# Patient Record
Sex: FEMALE | Race: WHITE | NOT HISPANIC OR LATINO | Employment: OTHER | URBAN - NONMETROPOLITAN AREA
[De-identification: names, ages, dates, MRNs, and addresses within clinical notes are randomized per-mention and may not be internally consistent; named-entity substitution may affect disease eponyms.]

---

## 2022-08-31 PROBLEM — M75.41 IMPINGEMENT SYNDROME OF RIGHT SHOULDER: Status: ACTIVE | Noted: 2022-08-31

## 2023-10-12 ENCOUNTER — OFFICE VISIT (OUTPATIENT)
Dept: MEDICAL GROUP | Facility: PHYSICIAN GROUP | Age: 58
End: 2023-10-12
Payer: COMMERCIAL

## 2023-10-12 VITALS
HEART RATE: 71 BPM | RESPIRATION RATE: 20 BRPM | BODY MASS INDEX: 39.35 KG/M2 | WEIGHT: 236.2 LBS | OXYGEN SATURATION: 96 % | SYSTOLIC BLOOD PRESSURE: 132 MMHG | HEIGHT: 65 IN | TEMPERATURE: 96.7 F | DIASTOLIC BLOOD PRESSURE: 84 MMHG

## 2023-10-12 DIAGNOSIS — K52.9 COLITIS: ICD-10-CM

## 2023-10-12 DIAGNOSIS — D64.9 ANEMIA, UNSPECIFIED TYPE: ICD-10-CM

## 2023-10-12 DIAGNOSIS — Z23 NEED FOR VACCINATION: ICD-10-CM

## 2023-10-12 DIAGNOSIS — E78.2 MIXED HYPERLIPIDEMIA: ICD-10-CM

## 2023-10-12 DIAGNOSIS — I10 ESSENTIAL HYPERTENSION: ICD-10-CM

## 2023-10-12 DIAGNOSIS — Z87.440 HISTORY OF RECURRENT UTIS: ICD-10-CM

## 2023-10-12 DIAGNOSIS — R73.09 ELEVATED GLUCOSE LEVEL: ICD-10-CM

## 2023-10-12 DIAGNOSIS — D75.89 MACROCYTOSIS WITHOUT ANEMIA: ICD-10-CM

## 2023-10-12 DIAGNOSIS — N18.31 STAGE 3A CHRONIC KIDNEY DISEASE: ICD-10-CM

## 2023-10-12 DIAGNOSIS — K21.9 GASTROESOPHAGEAL REFLUX DISEASE WITHOUT ESOPHAGITIS: ICD-10-CM

## 2023-10-12 DIAGNOSIS — Z12.83 SCREENING FOR SKIN CANCER: ICD-10-CM

## 2023-10-12 PROBLEM — M48.062 LUMBAR STENOSIS WITH NEUROGENIC CLAUDICATION: Status: RESOLVED | Noted: 2023-05-30 | Resolved: 2023-10-12

## 2023-10-12 PROBLEM — M75.120 FULL THICKNESS ROTATOR CUFF TEAR: Status: ACTIVE | Noted: 2022-10-10

## 2023-10-12 PROBLEM — M75.41 IMPINGEMENT SYNDROME OF RIGHT SHOULDER: Status: RESOLVED | Noted: 2022-08-31 | Resolved: 2023-10-12

## 2023-10-12 PROBLEM — M48.062 LUMBAR STENOSIS WITH NEUROGENIC CLAUDICATION: Status: ACTIVE | Noted: 2023-05-30

## 2023-10-12 PROBLEM — M75.120 FULL THICKNESS ROTATOR CUFF TEAR: Status: RESOLVED | Noted: 2022-10-10 | Resolved: 2023-10-12

## 2023-10-12 LAB
HBA1C MFR BLD: 5.6 % (ref ?–5.8)
POCT INT CON NEG: NEGATIVE
POCT INT CON POS: POSITIVE

## 2023-10-12 PROCEDURE — 3079F DIAST BP 80-89 MM HG: CPT | Performed by: NURSE PRACTITIONER

## 2023-10-12 PROCEDURE — 90715 TDAP VACCINE 7 YRS/> IM: CPT | Performed by: NURSE PRACTITIONER

## 2023-10-12 PROCEDURE — 3075F SYST BP GE 130 - 139MM HG: CPT | Performed by: NURSE PRACTITIONER

## 2023-10-12 PROCEDURE — 83036 HEMOGLOBIN GLYCOSYLATED A1C: CPT | Performed by: NURSE PRACTITIONER

## 2023-10-12 PROCEDURE — 90471 IMMUNIZATION ADMIN: CPT | Performed by: NURSE PRACTITIONER

## 2023-10-12 PROCEDURE — 99204 OFFICE O/P NEW MOD 45 MIN: CPT | Mod: 25 | Performed by: NURSE PRACTITIONER

## 2023-10-12 RX ORDER — ROSUVASTATIN CALCIUM 20 MG/1
1 TABLET, COATED ORAL
COMMUNITY
End: 2023-11-20 | Stop reason: SDUPTHER

## 2023-10-12 RX ORDER — MESALAMINE 1.2 G/1
2 TABLET, DELAYED RELEASE ORAL
COMMUNITY
End: 2023-10-12 | Stop reason: SDUPTHER

## 2023-10-12 RX ORDER — OMEPRAZOLE 20 MG/1
20 CAPSULE, DELAYED RELEASE ORAL
COMMUNITY

## 2023-10-12 RX ORDER — CEFDINIR 300 MG/1
CAPSULE ORAL
COMMUNITY
End: 2023-10-12

## 2023-10-12 RX ORDER — LOSARTAN POTASSIUM 25 MG/1
25 TABLET ORAL DAILY
COMMUNITY
End: 2023-11-20 | Stop reason: SDUPTHER

## 2023-10-12 RX ORDER — LOSARTAN POTASSIUM 25 MG/1
25 TABLET ORAL DAILY
COMMUNITY
Start: 2023-08-29 | End: 2023-10-12

## 2023-10-12 RX ORDER — OXYCODONE HYDROCHLORIDE AND ACETAMINOPHEN 5; 325 MG/1; MG/1
TABLET ORAL
COMMUNITY
End: 2023-10-12

## 2023-10-12 RX ORDER — NITROFURANTOIN 25; 75 MG/1; MG/1
1 CAPSULE ORAL 2 TIMES DAILY
COMMUNITY
End: 2023-10-12

## 2023-10-12 RX ORDER — VITAMIN B COMPLEX
1000 TABLET ORAL DAILY
COMMUNITY

## 2023-10-12 RX ORDER — MESALAMINE 1.2 G/1
2.4 TABLET, DELAYED RELEASE ORAL
Qty: 90 TABLET | Refills: 3 | Status: SHIPPED | OUTPATIENT
Start: 2023-10-12 | End: 2024-01-18 | Stop reason: SDUPTHER

## 2023-10-12 RX ORDER — METOPROLOL SUCCINATE 50 MG/1
1 TABLET, EXTENDED RELEASE ORAL
COMMUNITY
End: 2023-10-12

## 2023-10-12 RX ORDER — METOPROLOL SUCCINATE 100 MG/1
TABLET, EXTENDED RELEASE ORAL
COMMUNITY
End: 2023-10-12

## 2023-10-12 RX ORDER — LOSARTAN POTASSIUM AND HYDROCHLOROTHIAZIDE 12.5; 5 MG/1; MG/1
1 TABLET ORAL DAILY
COMMUNITY
End: 2023-10-12

## 2023-10-12 ASSESSMENT — ENCOUNTER SYMPTOMS
FEVER: 0
BLOOD IN STOOL: 0
ABDOMINAL PAIN: 0
CHILLS: 0
SHORTNESS OF BREATH: 0
CONSTIPATION: 0
NEUROLOGICAL NEGATIVE: 1
NAUSEA: 0
PSYCHIATRIC NEGATIVE: 1
DIARRHEA: 0
WEIGHT LOSS: 0
MUSCULOSKELETAL NEGATIVE: 1
VOMITING: 0

## 2023-10-12 ASSESSMENT — PATIENT HEALTH QUESTIONNAIRE - PHQ9: CLINICAL INTERPRETATION OF PHQ2 SCORE: 0

## 2023-10-12 ASSESSMENT — FIBROSIS 4 INDEX: FIB4 SCORE: 3.06

## 2023-10-12 NOTE — LETTER
[facility]  KRISTIN Du  [address]  [address]  [facility] [address]  Fax: [phone]   Authorization for Release/Disclosure of   Protected Health Information   Name: CLAUDIA GOETZ : 1965 SSN: [id number]   Address: [address] [place] [address] Phone:    [phone] (home)    I authorize the entity listed below to release/disclose the PHI below to:   [facility]/KRISTIN Du and KRISTIN Du   Provider or Entity Name:  [facility]    Address   City, [facility], Zip   [address] [place] [facility] [address]  Phone:  [phone]     Fax:     Reason for request: continuity of care   Information to be released:    [  ] LAST COLONOSCOPY,  including any PATH REPORT and follow-up  [  ] LAST FIT/COLOGUARD RESULT [  ] LAST DEXA  [  ] LAST MAMMOGRAM  [  ] LAST PAP  [  ] LAST LABS [  ] RETINA EXAM REPORT  [  ] IMMUNIZATION RECORDS  [  X] Release all info      [  ] Check here and initial the line next to each item to release ALL health information INCLUDING  _____ Care and treatment for drug and / or alcohol abuse  _____ HIV testing, infection status, or AIDS  _____ Genetic Testing    DATES OF SERVICE OR TIME PERIOD TO BE DISCLOSED: _____________  I understand and acknowledge that:  * This Authorization may be revoked at any time by you in writing, except if your health information has already been used or disclosed.  * Your health information that will be used or disclosed as a result of you signing this authorization could be re-disclosed by the recipient. If this occurs, your re-disclosed health information may no longer be protected by State or Federal laws.  * You may refuse to sign this Authorization. Your refusal will not affect your ability to obtain treatment.  * This Authorization becomes effective upon signing and will  on (date) __________.      If no date is indicated, this Authorization will  one (1) year from the signature date.     Name: Kay Fam  Signature: Date:   10/12/2023     PLEASE FAX REQUESTED RECORDS BACK TO: [phone]

## 2023-10-12 NOTE — LETTER
[facility]  KRISTIN Du  [address] [facility] [address]  [place] [address]  Fax: [phone]   Authorization for Release/Disclosure of   Protected Health Information   Name: CLAUDIA GOETZ : 1965 SSN: [id number]   Address: [address] [place] [address] Phone:    [phone] (home)    I authorize the entity listed below to release/disclose the PHI below to:   [facility]/KRISTIN Du and KRISTIN Du   Provider or Entity Name:  [facility], [facility], [facility]  [address] [place] [address] Rd, [place] [address] Phone:  [phone]    Fax:  [phone]   Reason for request: continuity of care   Information to be released:    [  ] LAST COLONOSCOPY,  including any PATH REPORT and follow-up  [  ] LAST FIT/COLOGUARD RESULT [  ] LAST DEXA  [  ] LAST MAMMOGRAM  [  ] LAST PAP  [  ] LAST LABS [  ] RETINA EXAM REPORT  [  ] IMMUNIZATION RECORDS  [  X] Release all info      [  ] Check here and initial the line next to each item to release ALL health information INCLUDING  _____ Care and treatment for drug and / or alcohol abuse  _____ HIV testing, infection status, or AIDS  _____ Genetic Testing    DATES OF SERVICE OR TIME PERIOD TO BE DISCLOSED: _____________  I understand and acknowledge that:  * This Authorization may be revoked at any time by you in writing, except if your health information has already been used or disclosed.  * Your health information that will be used or disclosed as a result of you signing this authorization could be re-disclosed by the recipient. If this occurs, your re-disclosed health information may no longer be protected by State or Federal laws.  * You may refuse to sign this Authorization. Your refusal will not affect your ability to obtain treatment.  * This Authorization becomes effective upon signing and will  on (date) __________.      If no date is indicated, this Authorization will  one (1) year from the  signature date.    Name: Kay Fam  Signature: Date:   10/12/2023     PLEASE FAX REQUESTED RECORDS BACK TO: [phone]

## 2023-10-12 NOTE — PROGRESS NOTES
Chief Complaint   Patient presents with    Establish Care    Medication Refill     Mesalamine     Referral Needed     derm      Subjective:     Kay Fam is a 58 y.o. female presenting for      Problem   Gastroesophageal Reflux Disease Without Esophagitis    Patient currently takes Prilosec 20 mg daily.  She does follow with gastroenterology in [place] for this.  We are pending medical records that she recently had a colonoscopy      Colitis    Patient states that she believes that she has a history of colitis.  She recent had a colonoscopy yesterday which we are pending records for.  Roughly 6 months ago she stopped taking mesalamine.  She states when she got her colonoscopy done they asked her why she stopped taking this and get back on it.  She does not know if she has  ulcerative colitis or Crohn's   Occasionally patient gets abdominal pain and mucus in her stool  Denies any blood in her stool abdominal distention chills, fevers, abdominal pain currently     Elevated Glucose Level    Chronic and stable condition.  Does not currently taking medication for this  States that she was never told that she had elevated glucose levels  A1c completed in office was 5.6       Macrocytosis Without Anemia    Elevated MCH and MCV  History of in past with anemia not no anemia on labs this time 9/11/2023   Latest Reference Range & Units 09/11/23 13:04   Hemoglobin 12.3 - 16.0 g/dL 15.2 (E)   Hematocrit 36.0 - 47.0 % 44.3 (E)   MCV 81.0 - 99.0 fL 101.1 (H) (E)   MCH 28.0 - 33.8 pg 34.6 (H) (E)   (H): Data is abnormally high  (E): External lab result     Stage 3a Chronic Kidney Disease (Hcc)    Patient states that since getting COVID she noticed a decrease in her GFR.    She was previously following with nephrology but no longer seeing them.    She was started on a medication however does not remember the name of it and states that they eventually took her off of it as her potassium was increasing.    We are pending medical  records     History of Recurrent Utis    Patient has a history of recurrent UTIs.  She currently takes a cranberry supplement for this 3 times a day and has noticed an improvement in her UTI.       Anemia    Patient states a history of anemia however labs from 9/11/2023 show stable hemoglobin hematocrit however there was an increase in her MCH and MCV.     Essential Hypertension    Chronic and stable condition.    Blood pressure in office 132/84   Currently takes losartan 25 mg and metoprolol 50 mg daily.       Mixed Hyperlipidemia    Patient currently takes 20 mg of Crestor daily.    labs form 9/11/2023  Chol 348  Tri 196  ldl 264  hdl 70  States last labs was high because she stopped taking it for 90 days   Will retrend labs in 3 months     Lumbar Stenosis With Neurogenic Claudication (Resolved)   Full Thickness Rotator Cuff Tear (Resolved)   Impingement Syndrome of Right Shoulder (Resolved)        Review of Systems   Constitutional:  Negative for chills, fever, malaise/fatigue and weight loss.   HENT: Negative.     Respiratory:  Negative for shortness of breath.    Cardiovascular:  Negative for chest pain.   Gastrointestinal:  Negative for abdominal pain, blood in stool, constipation, diarrhea, nausea and vomiting.   Genitourinary: Negative.    Musculoskeletal: Negative.    Neurological: Negative.    Psychiatric/Behavioral: Negative.            Current Outpatient Medications:     losartan (COZAAR) 25 MG Tab, Take 25 mg by mouth every day., Disp: , Rfl:     omeprazole (PRILOSEC) 20 MG delayed-release capsule, Take 20 mg by mouth every morning before breakfast., Disp: , Rfl:     rosuvastatin (CRESTOR) 20 MG Tab, Take 1 Tablet by mouth every day., Disp: , Rfl:     vitamin D3 (CHOLECALCIFEROL) 1000 Unit (25 mcg) Tab, Take 1,000 Units by mouth every day., Disp: , Rfl:     mesalamine (LIALDA) 1.2 GM Tablet Delayed Response, Take 2 Tablets by mouth every day., Disp: 90 Tablet, Rfl: 3    metoprolol SR (TOPROL XL) 50 MG  TABLET SR 24 HR, Take 50 mg by mouth every day., Disp: , Rfl:     Ascorbic Acid (VITAMIN C) 500 MG Cap, Take  by mouth every day., Disp: , Rfl:     Multiple Vitamins-Minerals (ZINC PO), Take  by mouth every day at 6 PM., Disp: , Rfl:     B Complex-Folic Acid (B COMPLEX-VITAMIN B12 PO), Take  by mouth every day. 3 tablets daily, Disp: , Rfl:     Past Medical History:   Diagnosis Date    Arthritis     low back    Full thickness rotator cuff tear 10/10/2022    H/O colonoscopy     Heart burn     High cholesterol     Hypertension     Impingement syndrome of right shoulder 2022    Infectious disease     2021 covid    Lumbar stenosis with neurogenic claudication 2023       Past Surgical History:   Procedure Laterality Date    PB SHLDR ARTHROSCOP,SURG,W/ROTAT CUFF REPB Right 2022    Procedure: RIGHT SHOULDER ARTHROSCOPY, ROTATOR CUFF REPAIR, SUBACROMIAL DECOMPRESSION, DISTAL CLAVICLE RESECTION, EXTENSIVE DEBRIDEMENT;  Surgeon: Luan Yip M.D.;  Location: SURGERY [facility];  Service: Orthopedics    PRIMARY C SECTION      NASAL POLYPECTOMY         Social History     Tobacco Use    Smoking status: Former     Current packs/day: 0.00     Types: Cigarettes     Quit date:      Years since quittin.7    Smokeless tobacco: Never   Vaping Use    Vaping Use: Never used   Substance Use Topics    Alcohol use: Yes     Alcohol/week: 1.8 oz     Types: 3 Standard drinks or equivalent per week     Comment: 2-3 daily    Drug use: Never       Family History   Problem Relation Age of Onset    No Known Problems Father     No Known Problems Maternal Grandmother     No Known Problems Maternal Grandfather     No Known Problems Paternal Grandmother     No Known Problems Paternal Grandfather        Patient has no known allergies.    Allergies, past medical history, past surgical history, family history, social history reviewed and updated    Objective:     Vitals: /84 (BP Location: Right arm, Patient  "Position: Sitting, BP Cuff Size: Adult)   Pulse 71   Temp 35.9 °C (96.7 °F) (Temporal)   Resp 20   Ht 1.651 m (5' 5\")   Wt 107 kg (236 lb 3.2 oz)   SpO2 96%   BMI 39.31 kg/m²     Physical Exam  Constitutional:       Appearance: Normal appearance. She is normal weight.   HENT:      Head: Normocephalic and atraumatic.      Right Ear: Tympanic membrane, ear canal and external ear normal.      Left Ear: Tympanic membrane, ear canal and external ear normal.      Nose: Nose normal.      Mouth/Throat:      Mouth: Mucous membranes are moist.      Pharynx: Oropharynx is clear.   Eyes:      Extraocular Movements: Extraocular movements intact.      Conjunctiva/sclera: Conjunctivae normal.      Pupils: Pupils are equal, round, and reactive to light.   Cardiovascular:      Rate and Rhythm: Normal rate and regular rhythm.      Pulses: Normal pulses.      Heart sounds: Normal heart sounds.   Pulmonary:      Effort: Pulmonary effort is normal.      Breath sounds: Normal breath sounds.   Musculoskeletal:         General: Normal range of motion.      Cervical back: Normal range of motion and neck supple. No tenderness.   Lymphadenopathy:      Cervical: No cervical adenopathy.   Skin:     General: Skin is warm and dry.      Capillary Refill: Capillary refill takes less than 2 seconds.   Neurological:      General: No focal deficit present.      Mental Status: She is alert and oriented to person, place, and time.   Psychiatric:         Mood and Affect: Mood normal.         Behavior: Behavior normal.         Thought Content: Thought content normal.         Judgment: Judgment normal.         Assessment/Plan:     1. Need for vaccination  - Tdap Vaccine =>8YO IM    2. Mixed hyperlipidemia  Chronic and stable condition  Continue Crestor  We will get new labs in 6 months    3. Essential hypertension  Chronic stable condition  Continue metoprolol and losartan    4. Gastroesophageal reflux disease without esophagitis  Chronic and stable " condition  Continue with Prilosec    5. Elevated glucose level  Chronic and stable condition  Continue to monitor  - POCT  A1C    6. Screening for skin cancer  - Referral to Dermatology    7. Colitis  Chronic and stable condition  Follow-up medical records  - mesalamine (LIALDA) 1.2 GM Tablet Delayed Response; Take 2 Tablets by mouth every day.  Dispense: 90 Tablet; Refill: 3    8. Macrocytosis without anemia  Chronic and stable condition  Continue to monitor    9. Stage 3a chronic kidney disease (HCC)  Chronic and stable condition  We will follow-up on medical records from nephrology  Stay away from nephrotoxic medications    10. History of recurrent UTIs  Chronic and stable condition  Continue monitor  Continue with cranberry pills    11. Anemia, unspecified type  Chronic and stable condition  Continue monitor       Discussed with patient possible alternative diagnoses, patient is to take all medications as prescribed.     If symptoms persist FU w/PCP, if symptoms worsen go to emergency room.     If experiencing any side effects from prescribed medications reports to the office immediately or go to emergency room.    Reviewed indication, dosage, usage and potential adverse effects of prescribed medications.     Reviewed risks and benefits of treatment plan. Patient verbalizes understanding of all instruction and verbally agrees to plan.    Discussed plan with the patient, and she agrees to the above.      I personally reviewed prior external notes and test results pertinent to today's visit.        Return in about 6 months (around 4/12/2024) for Pending medical records.      Please note that this dictation was created using voice recognition software. I have made every reasonable attempt to correct obvious errors, but I expect that there may be errors of grammar and possibly content that I did not discover before finalizing the note.

## 2023-11-21 RX ORDER — ROSUVASTATIN CALCIUM 20 MG/1
20 TABLET, COATED ORAL
Qty: 90 TABLET | Refills: 2 | Status: SHIPPED | OUTPATIENT
Start: 2023-11-21 | End: 2024-01-15 | Stop reason: SDUPTHER

## 2023-11-21 RX ORDER — METOPROLOL SUCCINATE 50 MG/1
50 TABLET, EXTENDED RELEASE ORAL
Qty: 90 TABLET | Refills: 2 | Status: SHIPPED | OUTPATIENT
Start: 2023-11-21 | End: 2024-01-15 | Stop reason: SDUPTHER

## 2023-11-21 RX ORDER — LOSARTAN POTASSIUM 25 MG/1
25 TABLET ORAL DAILY
Qty: 90 TABLET | Refills: 2 | Status: SHIPPED | OUTPATIENT
Start: 2023-11-21 | End: 2024-01-15 | Stop reason: SDUPTHER

## 2024-01-16 RX ORDER — ROSUVASTATIN CALCIUM 20 MG/1
20 TABLET, COATED ORAL
Qty: 90 TABLET | Refills: 2 | Status: SHIPPED | OUTPATIENT
Start: 2024-01-16

## 2024-01-16 RX ORDER — LOSARTAN POTASSIUM 25 MG/1
25 TABLET ORAL DAILY
Qty: 90 TABLET | Refills: 2 | Status: SHIPPED | OUTPATIENT
Start: 2024-01-16

## 2024-01-16 RX ORDER — METOPROLOL SUCCINATE 50 MG/1
50 TABLET, EXTENDED RELEASE ORAL
Qty: 90 TABLET | Refills: 2 | Status: SHIPPED | OUTPATIENT
Start: 2024-01-16

## 2024-01-18 DIAGNOSIS — K52.9 COLITIS: ICD-10-CM

## 2024-01-18 RX ORDER — MESALAMINE 1.2 G/1
2.4 TABLET, DELAYED RELEASE ORAL
Qty: 90 TABLET | Refills: 3 | Status: SHIPPED | OUTPATIENT
Start: 2024-01-18

## 2024-01-19 NOTE — TELEPHONE ENCOUNTER
Received request via: Patient    Was the patient seen in the last year in this department? Yes    Does the patient have an active prescription (recently filled or refills available) for medication(s) requested? No    Pharmacy Name: [facility]    Does the patient have custodial Plus and need 100 day supply (blood pressure, diabetes and cholesterol meds only)? Patient does not have SCP

## 2024-03-08 ENCOUNTER — TELEPHONE (OUTPATIENT)
Dept: MEDICAL GROUP | Facility: PHYSICIAN GROUP | Age: 59
End: 2024-03-08

## 2024-03-08 NOTE — TELEPHONE ENCOUNTER
Patient had called office wanting office to send her labs slips due to her misplacing them. I do not see any labs that have been ordered. Did you want patient to complete labs before her appointment on 4/12/2024 or will labs be ordered at the time of the visit.

## 2024-03-20 DIAGNOSIS — D75.89 MACROCYTOSIS WITHOUT ANEMIA: ICD-10-CM

## 2024-03-20 DIAGNOSIS — D64.9 ANEMIA, UNSPECIFIED TYPE: ICD-10-CM

## 2024-03-20 DIAGNOSIS — R73.09 ELEVATED GLUCOSE LEVEL: ICD-10-CM

## 2024-03-20 DIAGNOSIS — N18.31 STAGE 3A CHRONIC KIDNEY DISEASE: ICD-10-CM

## 2024-03-20 DIAGNOSIS — E78.2 MIXED HYPERLIPIDEMIA: ICD-10-CM

## 2024-04-12 ENCOUNTER — OFFICE VISIT (OUTPATIENT)
Dept: MEDICAL GROUP | Facility: PHYSICIAN GROUP | Age: 59
End: 2024-04-12

## 2024-04-12 VITALS
WEIGHT: 242 LBS | DIASTOLIC BLOOD PRESSURE: 84 MMHG | OXYGEN SATURATION: 96 % | TEMPERATURE: 98 F | RESPIRATION RATE: 14 BRPM | HEIGHT: 65 IN | HEART RATE: 98 BPM | BODY MASS INDEX: 40.32 KG/M2 | SYSTOLIC BLOOD PRESSURE: 128 MMHG

## 2024-04-12 DIAGNOSIS — R79.89 ELEVATED LFTS: ICD-10-CM

## 2024-04-12 DIAGNOSIS — D75.89 MACROCYTOSIS WITHOUT ANEMIA: ICD-10-CM

## 2024-04-12 DIAGNOSIS — R73.01 ELEVATED FASTING GLUCOSE: ICD-10-CM

## 2024-04-12 LAB
HBA1C MFR BLD: 5.8 % (ref ?–5.8)
POCT INT CON NEG: NEGATIVE
POCT INT CON POS: POSITIVE

## 2024-04-12 PROCEDURE — 99214 OFFICE O/P EST MOD 30 MIN: CPT | Performed by: NURSE PRACTITIONER

## 2024-04-12 PROCEDURE — 3074F SYST BP LT 130 MM HG: CPT | Performed by: NURSE PRACTITIONER

## 2024-04-12 PROCEDURE — 83036 HEMOGLOBIN GLYCOSYLATED A1C: CPT | Performed by: NURSE PRACTITIONER

## 2024-04-12 PROCEDURE — 3079F DIAST BP 80-89 MM HG: CPT | Performed by: NURSE PRACTITIONER

## 2024-04-12 ASSESSMENT — ENCOUNTER SYMPTOMS
WEIGHT LOSS: 0
PALPITATIONS: 0
CONSTIPATION: 0
DIARRHEA: 0
FEVER: 0
CHILLS: 0
VOMITING: 0
NEUROLOGICAL NEGATIVE: 1
NAUSEA: 0
SHORTNESS OF BREATH: 0
ABDOMINAL PAIN: 0

## 2024-04-12 ASSESSMENT — FIBROSIS 4 INDEX: FIB4 SCORE: 3.11

## 2024-04-12 ASSESSMENT — PATIENT HEALTH QUESTIONNAIRE - PHQ9: CLINICAL INTERPRETATION OF PHQ2 SCORE: 0

## 2024-04-12 NOTE — PROGRESS NOTES
Verbal consent was acquired by the patient to use [employer] ambient listening note generation during this visit     CC:  Chief Complaint   Patient presents with    Lab Results       Kay Fam 59 y.o. female  patient presenting for     History of Present Illness  The patient is a 59-year-old female presenting today to discuss recent blood work conducted on 04/09/2024. She is accompanied by her .    The patient's overall health status has been satisfactory.     HTN  She adheres to a regimen of metoprolol and losartan for hypertension management.    Coliits  The patient underwent a colonoscopy the day prior to her last visit in 10/2023. The results indicated colitis, for which she was advised to continue her mesalamine regimen of 2.4 mg tablets. A follow-up colonoscopy is scheduled for 10/2025.    hyperlipidemia  The patient has been prescribed Crestor 20 mg for hyperlipidemia management. She reports a lack of physical activity, attributing this to the cold weather. Her diet primarily consists of dinner, with a small afternoon snack consisting of a peanut butter sandwich and an apple. Her  prepares dinner nightly, and the patient reports a lack of appetite during the day. Her  notes a decrease in appetite following the onset of the COVID-19 pandemic. Her dinner typically consists of grilled tacos, vegetables, protein, and a noodles, rice, or potato.    Decreased GFR  The patient denies the use of NSAIDs or ibuprofen. She maintains adequate hydration, consuming a glassful of elena water in the morning instead of coffee. She does not require any medication refills at this time.      Review of Systems   Constitutional:  Negative for chills, fever, malaise/fatigue and weight loss.   Respiratory:  Negative for shortness of breath.    Cardiovascular:  Negative for chest pain and palpitations.   Gastrointestinal:  Negative for abdominal pain, constipation, diarrhea, nausea and vomiting.  "  Neurological: Negative.        /84   Pulse 98   Temp 36.7 °C (98 °F) (Temporal)   Resp 14   Ht 1.651 m (5' 5\")   Wt 110 kg (242 lb)   SpO2 96% , Body mass index is 40.27 kg/m².    Physical Exam  Constitutional:       Appearance: Normal appearance. She is obese.   HENT:      Head: Normocephalic and atraumatic.   Cardiovascular:      Rate and Rhythm: Normal rate and regular rhythm.      Pulses: Normal pulses.   Pulmonary:      Effort: Pulmonary effort is normal.      Breath sounds: Normal breath sounds.   Musculoskeletal:         General: Normal range of motion.   Skin:     General: Skin is warm.   Neurological:      Mental Status: She is alert and oriented to person, place, and time.   Psychiatric:         Mood and Affect: Mood normal.         Behavior: Behavior normal.         Thought Content: Thought content normal.         Judgment: Judgment normal.           Results  Laboratory Studies 4/9/2024  Glucose was a little bit high, a little bit higher than it was last year.  BUN is stable. Creatinine has come down, GFR is 51. Sodium and chloride are better. AST and ALT are slowly creeping up 70/36.     Assessment and Plan    Assessment & Plan  1. Elevated fasting glucose  Chronic and stable condition   Discussed diet and exercise  The Mediterranean Diet emphasizes plant-based foods and healthy fats. You eat mostly veggies, fruits and whole grains. Extra virgin olive oil is the main source of fat. The Mediterranean Diet can lower your risk of cardiovascular disease and many other chronic conditions.   It is recommended by the [place] Preventative Services Task Force (USPSTF) that adults 18 years and older participate in at least 150 minutes of moderate activity as well as 2 days of strength training weekly.   - POCT  A1C    2. Macrocytosis without anemia  Chronic and stable condition  Will get new labs in 1 month  - VIT B12,  FOLIC ACID    3. Elevated LFTs  Chronic and stable condition  Will get ne " labs in 1 month  - HEPATIC FUNCTION PANEL; Future          Discussed with patient possible alternative diagnoses, patient is to take all medications as prescribed.     If symptoms persist FU w/PCP, if symptoms worsen go to emergency room.     If experiencing any side effects from prescribed medications reports to the office immediately or go to emergency room.    Reviewed indication, dosage, usage and potential adverse effects of prescribed medications.     Reviewed risks and benefits of treatment plan. Patient verbalizes understanding of all instruction and verbally agrees to plan.    Return in about 4 weeks (around 5/10/2024) for follow up labs.    This note was created using voice recognition software ([email or web address]). The accuracy of the dictation is limited by the abilities of the software. I have reviewed the note prior to signing, however some errors in grammar and context are still possible. If you have any questions related to this note please do not hesitate to contact our office.

## 2024-05-01 ENCOUNTER — TELEPHONE (OUTPATIENT)
Dept: MEDICAL GROUP | Facility: PHYSICIAN GROUP | Age: 59
End: 2024-05-01

## 2024-05-02 DIAGNOSIS — Z12.83 SCREENING FOR SKIN CANCER: ICD-10-CM

## 2024-08-29 DIAGNOSIS — K52.9 COLITIS: ICD-10-CM

## 2024-08-29 RX ORDER — MESALAMINE 1.2 G/1
2.4 TABLET, DELAYED RELEASE ORAL DAILY
Qty: 90 TABLET | Refills: 0 | Status: SHIPPED | OUTPATIENT
Start: 2024-08-29